# Patient Record
Sex: FEMALE | HISPANIC OR LATINO | Employment: STUDENT | URBAN - METROPOLITAN AREA
[De-identification: names, ages, dates, MRNs, and addresses within clinical notes are randomized per-mention and may not be internally consistent; named-entity substitution may affect disease eponyms.]

---
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
## 2024-02-17 ENCOUNTER — HOSPITAL ENCOUNTER (EMERGENCY)
Facility: CLINIC | Age: 19
Discharge: HOME | End: 2024-02-17
Attending: PHYSICIAN ASSISTANT | Admitting: PHYSICIAN ASSISTANT
Payer: COMMERCIAL

## 2024-02-17 ENCOUNTER — APPOINTMENT (OUTPATIENT)
Dept: GENERAL RADIOLOGY | Facility: CLINIC | Age: 19
End: 2024-02-17
Attending: PHYSICIAN ASSISTANT
Payer: COMMERCIAL

## 2024-02-17 VITALS
OXYGEN SATURATION: 95 % | RESPIRATION RATE: 16 BRPM | WEIGHT: 135 LBS | HEART RATE: 89 BPM | SYSTOLIC BLOOD PRESSURE: 109 MMHG | BODY MASS INDEX: 25.49 KG/M2 | TEMPERATURE: 98.5 F | DIASTOLIC BLOOD PRESSURE: 81 MMHG | HEIGHT: 61 IN

## 2024-02-17 DIAGNOSIS — J06.9 URI (UPPER RESPIRATORY INFECTION): ICD-10-CM

## 2024-02-17 DIAGNOSIS — R07.9 CHEST PAIN: ICD-10-CM

## 2024-02-17 DIAGNOSIS — R06.00 DYSPNEA: ICD-10-CM

## 2024-02-17 LAB
ALBUMIN SERPL BCG-MCNC: 4.7 G/DL (ref 3.5–5.2)
ALP SERPL-CCNC: 84 U/L (ref 40–150)
ALT SERPL W P-5'-P-CCNC: 5 U/L (ref 0–50)
ANION GAP SERPL CALCULATED.3IONS-SCNC: 12 MMOL/L (ref 7–15)
AST SERPL W P-5'-P-CCNC: 13 U/L (ref 0–45)
ATRIAL RATE - MUSE: 104 BPM
BASOPHILS # BLD AUTO: 0.1 10E3/UL (ref 0–0.2)
BASOPHILS NFR BLD AUTO: 0 %
BILIRUB DIRECT SERPL-MCNC: <0.2 MG/DL (ref 0–0.3)
BILIRUB SERPL-MCNC: 0.3 MG/DL
BUN SERPL-MCNC: 7.5 MG/DL (ref 6–20)
CALCIUM SERPL-MCNC: 9.3 MG/DL (ref 8.6–10)
CHLORIDE SERPL-SCNC: 102 MMOL/L (ref 98–107)
CREAT SERPL-MCNC: 0.6 MG/DL (ref 0.51–0.95)
D DIMER PPP FEU-MCNC: <0.27 UG/ML FEU (ref 0–0.5)
DEPRECATED HCO3 PLAS-SCNC: 24 MMOL/L (ref 22–29)
DIASTOLIC BLOOD PRESSURE - MUSE: NORMAL MMHG
EGFRCR SERPLBLD CKD-EPI 2021: >90 ML/MIN/1.73M2
EOSINOPHIL # BLD AUTO: 0.3 10E3/UL (ref 0–0.7)
EOSINOPHIL NFR BLD AUTO: 3 %
ERYTHROCYTE [DISTWIDTH] IN BLOOD BY AUTOMATED COUNT: 12.9 % (ref 10–15)
FLUAV RNA SPEC QL NAA+PROBE: NEGATIVE
FLUBV RNA RESP QL NAA+PROBE: NEGATIVE
GLUCOSE SERPL-MCNC: 96 MG/DL (ref 70–99)
HCT VFR BLD AUTO: 40.2 % (ref 35–47)
HGB BLD-MCNC: 13.3 G/DL (ref 11.7–15.7)
HOLD SPECIMEN: NORMAL
HOLD SPECIMEN: NORMAL
IMM GRANULOCYTES # BLD: 0 10E3/UL
IMM GRANULOCYTES NFR BLD: 0 %
INTERPRETATION ECG - MUSE: NORMAL
LIPASE SERPL-CCNC: 17 U/L (ref 13–60)
LYMPHOCYTES # BLD AUTO: 1.3 10E3/UL (ref 0.8–5.3)
LYMPHOCYTES NFR BLD AUTO: 12 %
MCH RBC QN AUTO: 29.2 PG (ref 26.5–33)
MCHC RBC AUTO-ENTMCNC: 33.1 G/DL (ref 31.5–36.5)
MCV RBC AUTO: 88 FL (ref 78–100)
MONOCYTES # BLD AUTO: 0.6 10E3/UL (ref 0–1.3)
MONOCYTES NFR BLD AUTO: 5 %
NEUTROPHILS # BLD AUTO: 9.2 10E3/UL (ref 1.6–8.3)
NEUTROPHILS NFR BLD AUTO: 80 %
NRBC # BLD AUTO: 0 10E3/UL
NRBC BLD AUTO-RTO: 0 /100
P AXIS - MUSE: 52 DEGREES
PLATELET # BLD AUTO: 357 10E3/UL (ref 150–450)
POTASSIUM SERPL-SCNC: 4 MMOL/L (ref 3.4–5.3)
PR INTERVAL - MUSE: 122 MS
PROT SERPL-MCNC: 8.1 G/DL (ref 6.4–8.3)
QRS DURATION - MUSE: 80 MS
QT - MUSE: 344 MS
QTC - MUSE: 452 MS
R AXIS - MUSE: 68 DEGREES
RBC # BLD AUTO: 4.56 10E6/UL (ref 3.8–5.2)
RSV RNA SPEC NAA+PROBE: NEGATIVE
SARS-COV-2 RNA RESP QL NAA+PROBE: NEGATIVE
SODIUM SERPL-SCNC: 138 MMOL/L (ref 135–145)
SYSTOLIC BLOOD PRESSURE - MUSE: NORMAL MMHG
T AXIS - MUSE: -17 DEGREES
TROPONIN T SERPL HS-MCNC: <6 NG/L
VENTRICULAR RATE- MUSE: 104 BPM
WBC # BLD AUTO: 11.4 10E3/UL (ref 4–11)

## 2024-02-17 PROCEDURE — 82247 BILIRUBIN TOTAL: CPT | Performed by: PHYSICIAN ASSISTANT

## 2024-02-17 PROCEDURE — 85025 COMPLETE CBC W/AUTO DIFF WBC: CPT | Performed by: EMERGENCY MEDICINE

## 2024-02-17 PROCEDURE — 80048 BASIC METABOLIC PNL TOTAL CA: CPT | Performed by: PHYSICIAN ASSISTANT

## 2024-02-17 PROCEDURE — 87637 SARSCOV2&INF A&B&RSV AMP PRB: CPT | Performed by: SOCIAL WORKER

## 2024-02-17 PROCEDURE — 85025 COMPLETE CBC W/AUTO DIFF WBC: CPT | Performed by: PHYSICIAN ASSISTANT

## 2024-02-17 PROCEDURE — 83690 ASSAY OF LIPASE: CPT | Performed by: PHYSICIAN ASSISTANT

## 2024-02-17 PROCEDURE — 250N000013 HC RX MED GY IP 250 OP 250 PS 637: Performed by: PHYSICIAN ASSISTANT

## 2024-02-17 PROCEDURE — 85379 FIBRIN DEGRADATION QUANT: CPT | Performed by: PHYSICIAN ASSISTANT

## 2024-02-17 PROCEDURE — 93005 ELECTROCARDIOGRAM TRACING: CPT

## 2024-02-17 PROCEDURE — 84484 ASSAY OF TROPONIN QUANT: CPT | Performed by: PHYSICIAN ASSISTANT

## 2024-02-17 PROCEDURE — 99285 EMERGENCY DEPT VISIT HI MDM: CPT | Mod: 25

## 2024-02-17 PROCEDURE — 36415 COLL VENOUS BLD VENIPUNCTURE: CPT | Performed by: EMERGENCY MEDICINE

## 2024-02-17 PROCEDURE — 71046 X-RAY EXAM CHEST 2 VIEWS: CPT

## 2024-02-17 PROCEDURE — 87637 SARSCOV2&INF A&B&RSV AMP PRB: CPT | Performed by: PHYSICIAN ASSISTANT

## 2024-02-17 RX ORDER — FAMOTIDINE 10 MG
10 TABLET ORAL 2 TIMES DAILY
Qty: 28 TABLET | Refills: 0 | Status: SHIPPED | OUTPATIENT
Start: 2024-02-17 | End: 2024-03-02

## 2024-02-17 RX ORDER — MAGNESIUM HYDROXIDE/ALUMINUM HYDROXICE/SIMETHICONE 120; 1200; 1200 MG/30ML; MG/30ML; MG/30ML
15 SUSPENSION ORAL ONCE
Status: COMPLETED | OUTPATIENT
Start: 2024-02-17 | End: 2024-02-17

## 2024-02-17 RX ADMIN — ALUMINUM HYDROXIDE, MAGNESIUM HYDROXIDE, AND SIMETHICONE 15 ML: 200; 200; 20 SUSPENSION ORAL at 15:36

## 2024-02-17 ASSESSMENT — ACTIVITIES OF DAILY LIVING (ADL)
ADLS_ACUITY_SCORE: 35
ADLS_ACUITY_SCORE: 35

## 2024-02-17 NOTE — ED TRIAGE NOTES
"Pt presents to ed to be evaluated for mid sternal chest pain and shortness of breath.   Pt reports that she recently had a cold where she had a runny nose and sore throat, and then starting last night patient started experiencing mid sternal chest pain that started while she was laying down. Pt reports the pain is pressure and \"sharp\" and that sometimes it makes it harder for her to breath.   Pt denies any recent travel, and is not on birth control. Pt denies any leg pain/swelling.      Triage Assessment (Adult)       Row Name 02/17/24 1220          Triage Assessment    Airway WDL WDL        Cardiac WDL    Cardiac WDL chest pain         Cognitive/Neuro/Behavioral WDL    Cognitive/Neuro/Behavioral WDL WDL                     "

## 2024-02-17 NOTE — ED NOTES
Patient with vasovagal response to IV poke. Patient lightheaded and dizzy, BP down to 70/36. Patient reclined in chair, patient feeling better after several minutes. BP back up to 90/46.

## 2024-02-17 NOTE — ED PROVIDER NOTES
"  History     Chief Complaint:  Chest Pain and Shortness of Breath       HPI   Rosetta Salguero is a 18 year old female who presents for evaluation of chest pain and shortness of breath. She states she has cold-like symptoms primarily of sore throat and rhinorrhea for the past 2 days, and began to feel short of breath around midnight while going to bed. Today, she had increased shortness of breath and a new onset of midsternal chest pain at 0800 while arriving at work. She had to leave work early around 1130 and came to the ED. The chest pain is still present and nonradiating. She describes the pain as a pressure sensation with intermittent sharp pain. Pain is nonpositional and unaffecting by breathing. She denies cough, leg swelling, abdominal pain, nausea, vomiting, abnormal food intake, history of blood clots, hormone use, recent surgeries, or anticoagulant use. No abdominal surgeries.    Independent Historian:   None - Patient Only    Review of External Notes:   None    Medications:  The patient is currently on no regular medications.    Past Medical History:     The patient denies past medical history.      Past Surgical History:    None    Physical Exam   Patient Vitals for the past 24 hrs:   BP Temp Temp src Pulse Resp SpO2 Height Weight   02/17/24 1652 109/81 -- -- 89 -- 96 % -- --   02/17/24 1650 109/81 -- -- 97 -- -- -- --   02/17/24 1530 -- -- -- -- -- 95 % -- --   02/17/24 1510 102/75 -- -- -- -- 93 % -- --   02/17/24 1506 -- -- -- -- -- 93 % -- --   02/17/24 1505 102/75 -- -- 100 -- -- -- --   02/17/24 1458 90/46 -- -- -- -- 97 % -- --   02/17/24 1450 (!) 70/36 -- -- -- -- -- -- --   02/17/24 1209 110/73 98.5  F (36.9  C) Temporal 99 16 96 % 1.549 m (5' 1\") 61.2 kg (135 lb)        Physical Exam  Constitutional: Pleasant. Cooperative.   Eyes: Pupils equally round and reactive  HENT: Head is normal in appearance. Oropharynx is normal with moist mucus membranes.  Cardiovascular: Regular rate and rhythm " and without murmurs.  Respiratory: Normal respiratory effort, lungs are clear bilaterally.  GI: Mild RUQ TTP, otherwise non-tender, soft, non-distended. No guarding, rebound, or rigidity.  Musculoskeletal: No asymmetry of the lower extremities.  Skin: Normal, without rash.  Neurologic: Cranial nerves grossly intact, normal cognition, no focal deficits. Alert and oriented x 3.   Psychiatric: Normal affect.  Nursing notes and vital signs reviewed.      Emergency Department Course   ECG  ECG results from 02/17/24   EKG 12-lead, tracing only     Value    Systolic Blood Pressure     Diastolic Blood Pressure     Ventricular Rate 104    Atrial Rate 104    CA Interval 122    QRS Duration 80        QTc 452    P Axis 52    R AXIS 68    T Axis -17    Interpretation ECG      Sinus tachycardia  Nonspecific T wave abnormality  Abnormal ECG  When compared with ECG of 03-JAN-2020 18:51,  T wave inversion more evident in Inferior leads  Nonspecific T wave abnormality now evident in Lateral leads  Confirmed by GENERATED REPORT, COMPUTER ([id number]),  CROW CROW ([id number]) on 2/17/2024 1:48:16 PM           Imaging:  XR Chest 2 Views   Final Result   IMPRESSION: No focal airspace consolidation. No pleural effusion or pneumothorax.      Cardiomediastinal silhouette is normal.                    Laboratory:  Labs Ordered and Resulted from Time of ED Arrival to Time of ED Departure   CBC WITH PLATELETS AND DIFFERENTIAL - Abnormal       Result Value    WBC Count 11.4 (*)     RBC Count 4.56      Hemoglobin 13.3      Hematocrit 40.2      MCV 88      MCH 29.2      MCHC 33.1      RDW 12.9      Platelet Count 357      % Neutrophils 80      % Lymphocytes 12      % Monocytes 5      % Eosinophils 3      % Basophils 0      % Immature Granulocytes 0      NRBCs per 100 WBC 0      Absolute Neutrophils 9.2 (*)     Absolute Lymphocytes 1.3      Absolute Monocytes 0.6      Absolute Eosinophils 0.3      Absolute Basophils 0.1      Absolute Immature  Granulocytes 0.0      Absolute NRBCs 0.0     INFLUENZA A/B, RSV, & SARS-COV2 PCR - Normal    Influenza A PCR Negative      Influenza B PCR Negative      RSV PCR Negative      SARS CoV2 PCR Negative     TROPONIN T, HIGH SENSITIVITY - Normal    Troponin T, High Sensitivity <6     BASIC METABOLIC PANEL - Normal    Sodium 138      Potassium 4.0      Chloride 102      Carbon Dioxide (CO2) 24      Anion Gap 12      Urea Nitrogen 7.5      Creatinine 0.60      GFR Estimate >90      Calcium 9.3      Glucose 96     D DIMER QUANTITATIVE - Normal    D-Dimer Quantitative <0.27     HEPATIC FUNCTION PANEL - Normal    Protein Total 8.1      Albumin 4.7      Bilirubin Total 0.3      Alkaline Phosphatase 84      AST 13      ALT 5      Bilirubin Direct <0.20     LIPASE - Normal    Lipase 17          Emergency Department Course & Assessments:    Interventions:  Medications   alum & mag hydroxide-simethicone (MAALOX) suspension 15 mL (15 mLs Oral $Given 2/17/24 1536)        Independent Interpretation (X-rays, CTs, rhythm strip):  I personally evaluated the CXR, no obvious PNA, PTX.    Assessments/Consultations/Discussion of Management or Tests:  ED Course as of 02/17/24 1746   Sat Feb 17, 2024   1501 I obtained history and examined the patient, as noted above.   1554 I rechecked and updated the patient. No change after GI cocktail.   1734 I rechecked and updated the patient.       Social Determinants of Health affecting care:   None    Disposition:  The patient was discharged to home.     Impression & Plan      Medical Decision Making:  Rosetta Salguero is a 18 year old female who presents to the ED for evaluation of chest pain and dyspnea in the setting of concurrent URI.  See HPI as above for additional details.  Vitals and physical exam as above.  Differential is broad and included ACS, dissection, PE, pneumothorax, GERD, MSK, pneumonia, pericarditis, referred pain from abdomen such as pancreatitis, biliary pathology, amongst  others.  Workup obtained as above.  Troponin is negative despite multiple hours of symptoms, making ACS less likely in a low risk patient.  ECG without any ST changes.  Chest x-ray reassuring as above.  LFTs and lipase WNL as above.  D-dimer is negative, making PE less likely.  Discussed with patient unclear etiology of her symptoms at this time.  No evidence for acute nefarious pathology notified based upon the above workup.  We did discuss option for possible RUQ ultrasound to rule out biliary pathology, however with shared decision making patient would like to defer at this time and consider OTC Pepcid and close monitoring of symptoms.  She will follow-up with PCP with persistent symptoms and have a low threshold to return with any new or worsening symptoms. HEART 1. Discussed reasons to return. All questions answered. Patient discharged to home in stable condition.    Diagnosis:    ICD-10-CM    1. Chest pain  R07.9       2. Dyspnea  R06.00       3. URI (upper respiratory infection)  J06.9            Discharge Medications:  New Prescriptions    FAMOTIDINE (PEPCID) 10 MG TABLET    Take 1 tablet (10 mg) by mouth 2 times daily for 14 days          Scribe Disclosure:  I, Guy Lewis, am serving as a scribe at 3:01 PM on 2/17/2024 to document services personally performed by No att. providers found based on my observations and the provider's statements to me.       This record was created at least in part using electronic voice recognition software, so please excuse any typographical errors.       Clifton Sanford PA-C  02/17/24 [id number]

## 2024-02-17 NOTE — DISCHARGE INSTRUCTIONS
The cause of your symptoms is unclear at this time. There is no evidence for life-threatening process identified based upon the labs, imaging, and ECG obtained here. Trial a 1-2 week course of pepcid to see if this helps with symptoms. Follow up with primary doctor with persistent symptoms. Return with worsening.